# Patient Record
Sex: FEMALE | Race: WHITE | NOT HISPANIC OR LATINO | Employment: OTHER | URBAN - METROPOLITAN AREA
[De-identification: names, ages, dates, MRNs, and addresses within clinical notes are randomized per-mention and may not be internally consistent; named-entity substitution may affect disease eponyms.]

---

## 2020-01-17 ENCOUNTER — LAB VISIT (OUTPATIENT)
Dept: LAB | Facility: HOSPITAL | Age: 70
End: 2020-01-17
Attending: INTERNAL MEDICINE
Payer: MEDICARE

## 2020-01-17 ENCOUNTER — OFFICE VISIT (OUTPATIENT)
Dept: RHEUMATOLOGY | Facility: CLINIC | Age: 70
End: 2020-01-17
Payer: MEDICARE

## 2020-01-17 VITALS
BODY MASS INDEX: 22.65 KG/M2 | HEART RATE: 89 BPM | WEIGHT: 132.69 LBS | HEIGHT: 64 IN | SYSTOLIC BLOOD PRESSURE: 160 MMHG | DIASTOLIC BLOOD PRESSURE: 90 MMHG

## 2020-01-17 DIAGNOSIS — M25.50 ARTHRALGIA, UNSPECIFIED JOINT: Primary | ICD-10-CM

## 2020-01-17 DIAGNOSIS — M25.50 ARTHRALGIA, UNSPECIFIED JOINT: ICD-10-CM

## 2020-01-17 DIAGNOSIS — R21 RASH: ICD-10-CM

## 2020-01-17 LAB
ALBUMIN SERPL BCP-MCNC: 4 G/DL (ref 3.5–5.2)
ALP SERPL-CCNC: 71 U/L (ref 55–135)
ALT SERPL W/O P-5'-P-CCNC: 23 U/L (ref 10–44)
ANION GAP SERPL CALC-SCNC: 13 MMOL/L (ref 8–16)
AST SERPL-CCNC: 16 U/L (ref 10–40)
BASOPHILS # BLD AUTO: 0.08 K/UL (ref 0–0.2)
BASOPHILS NFR BLD: 0.7 % (ref 0–1.9)
BILIRUB SERPL-MCNC: 0.4 MG/DL (ref 0.1–1)
BUN SERPL-MCNC: 26 MG/DL (ref 8–23)
CALCIUM SERPL-MCNC: 9.9 MG/DL (ref 8.7–10.5)
CHLORIDE SERPL-SCNC: 104 MMOL/L (ref 95–110)
CO2 SERPL-SCNC: 25 MMOL/L (ref 23–29)
CREAT SERPL-MCNC: 0.9 MG/DL (ref 0.5–1.4)
CRP SERPL-MCNC: 15 MG/L (ref 0–8.2)
DIFFERENTIAL METHOD: ABNORMAL
EOSINOPHIL # BLD AUTO: 0.3 K/UL (ref 0–0.5)
EOSINOPHIL NFR BLD: 2.3 % (ref 0–8)
ERYTHROCYTE [DISTWIDTH] IN BLOOD BY AUTOMATED COUNT: 13.2 % (ref 11.5–14.5)
ERYTHROCYTE [SEDIMENTATION RATE] IN BLOOD BY WESTERGREN METHOD: 24 MM/HR (ref 0–36)
EST. GFR  (AFRICAN AMERICAN): >60 ML/MIN/1.73 M^2
EST. GFR  (NON AFRICAN AMERICAN): >60 ML/MIN/1.73 M^2
GLUCOSE SERPL-MCNC: 87 MG/DL (ref 70–110)
HCT VFR BLD AUTO: 45.3 % (ref 37–48.5)
HGB BLD-MCNC: 14.8 G/DL (ref 12–16)
IMM GRANULOCYTES # BLD AUTO: 0.04 K/UL (ref 0–0.04)
IMM GRANULOCYTES NFR BLD AUTO: 0.4 % (ref 0–0.5)
LYMPHOCYTES # BLD AUTO: 2 K/UL (ref 1–4.8)
LYMPHOCYTES NFR BLD: 17.2 % (ref 18–48)
MCH RBC QN AUTO: 30.6 PG (ref 27–31)
MCHC RBC AUTO-ENTMCNC: 32.7 G/DL (ref 32–36)
MCV RBC AUTO: 94 FL (ref 82–98)
MONOCYTES # BLD AUTO: 0.7 K/UL (ref 0.3–1)
MONOCYTES NFR BLD: 5.8 % (ref 4–15)
NEUTROPHILS # BLD AUTO: 8.4 K/UL (ref 1.8–7.7)
NEUTROPHILS NFR BLD: 74 % (ref 38–73)
NRBC BLD-RTO: 0 /100 WBC
PLATELET # BLD AUTO: 302 K/UL (ref 150–350)
PMV BLD AUTO: 9.2 FL (ref 9.2–12.9)
POTASSIUM SERPL-SCNC: 3.5 MMOL/L (ref 3.5–5.1)
PROT SERPL-MCNC: 7.4 G/DL (ref 6–8.4)
RBC # BLD AUTO: 4.83 M/UL (ref 4–5.4)
SODIUM SERPL-SCNC: 142 MMOL/L (ref 136–145)
WBC # BLD AUTO: 11.4 K/UL (ref 3.9–12.7)

## 2020-01-17 PROCEDURE — 99999 PR PBB SHADOW E&M-NEW PATIENT-LVL IV: ICD-10-PCS | Mod: PBBFAC,,, | Performed by: INTERNAL MEDICINE

## 2020-01-17 PROCEDURE — 80053 COMPREHEN METABOLIC PANEL: CPT

## 2020-01-17 PROCEDURE — 86140 C-REACTIVE PROTEIN: CPT

## 2020-01-17 PROCEDURE — 99205 PR OFFICE/OUTPT VISIT, NEW, LEVL V, 60-74 MIN: ICD-10-PCS | Mod: S$PBB,,, | Performed by: INTERNAL MEDICINE

## 2020-01-17 PROCEDURE — 86431 RHEUMATOID FACTOR QUANT: CPT

## 2020-01-17 PROCEDURE — 1125F AMNT PAIN NOTED PAIN PRSNT: CPT | Mod: ,,, | Performed by: INTERNAL MEDICINE

## 2020-01-17 PROCEDURE — 1159F PR MEDICATION LIST DOCUMENTED IN MEDICAL RECORD: ICD-10-PCS | Mod: ,,, | Performed by: INTERNAL MEDICINE

## 2020-01-17 PROCEDURE — 99204 OFFICE O/P NEW MOD 45 MIN: CPT | Mod: PBBFAC | Performed by: INTERNAL MEDICINE

## 2020-01-17 PROCEDURE — 1125F PR PAIN SEVERITY QUANTIFIED, PAIN PRESENT: ICD-10-PCS | Mod: ,,, | Performed by: INTERNAL MEDICINE

## 2020-01-17 PROCEDURE — 99999 PR PBB SHADOW E&M-NEW PATIENT-LVL IV: CPT | Mod: PBBFAC,,, | Performed by: INTERNAL MEDICINE

## 2020-01-17 PROCEDURE — 86200 CCP ANTIBODY: CPT

## 2020-01-17 PROCEDURE — 99205 OFFICE O/P NEW HI 60 MIN: CPT | Mod: S$PBB,,, | Performed by: INTERNAL MEDICINE

## 2020-01-17 PROCEDURE — 36415 COLL VENOUS BLD VENIPUNCTURE: CPT

## 2020-01-17 PROCEDURE — 1159F MED LIST DOCD IN RCRD: CPT | Mod: ,,, | Performed by: INTERNAL MEDICINE

## 2020-01-17 PROCEDURE — 85025 COMPLETE CBC W/AUTO DIFF WBC: CPT

## 2020-01-17 PROCEDURE — 86038 ANTINUCLEAR ANTIBODIES: CPT

## 2020-01-17 PROCEDURE — 85652 RBC SED RATE AUTOMATED: CPT

## 2020-01-17 PROCEDURE — 86060 ANTISTREPTOLYSIN O TITER: CPT

## 2020-01-17 RX ORDER — LOVASTATIN 20 MG/1
TABLET ORAL
COMMUNITY
Start: 2019-10-31

## 2020-01-17 RX ORDER — ESTRADIOL 0.1 MG/G
CREAM VAGINAL
COMMUNITY

## 2020-01-17 RX ORDER — ESTRADIOL 0.5 MG/1
TABLET ORAL
COMMUNITY
Start: 2020-01-04

## 2020-01-17 RX ORDER — ERGOCALCIFEROL 1.25 MG/1
CAPSULE ORAL
COMMUNITY

## 2020-01-17 RX ORDER — NITROGLYCERIN 80 MG/1
PATCH TRANSDERMAL
COMMUNITY
Start: 2019-12-31

## 2020-01-17 RX ORDER — DEXTROAMPHETAMINE SACCHARATE, AMPHETAMINE ASPARTATE, DEXTROAMPHETAMINE SULFATE AND AMPHETAMINE SULFATE 5; 5; 5; 5 MG/1; MG/1; MG/1; MG/1
TABLET ORAL
COMMUNITY
Start: 2019-12-20

## 2020-01-17 RX ORDER — HYOSCYAMINE SULFATE 0.125 MG
0.12 TABLET ORAL EVERY 4 HOURS PRN
COMMUNITY

## 2020-01-17 RX ORDER — PRAMIPEXOLE DIHYDROCHLORIDE 0.5 MG/1
TABLET ORAL
COMMUNITY
Start: 2019-12-19

## 2020-01-17 RX ORDER — HYDROCODONE BITARTRATE AND ACETAMINOPHEN 10; 325 MG/1; MG/1
TABLET ORAL
COMMUNITY
Start: 2019-12-17

## 2020-01-17 RX ORDER — AMLODIPINE BESYLATE 10 MG/1
TABLET ORAL
COMMUNITY
Start: 2020-01-03

## 2020-01-17 RX ORDER — ALBUTEROL SULFATE 90 UG/1
AEROSOL, METERED RESPIRATORY (INHALATION)
COMMUNITY
Start: 2019-09-04

## 2020-01-17 RX ORDER — IBANDRONATE SODIUM 150 MG/1
TABLET, FILM COATED ORAL
COMMUNITY
Start: 2020-01-04

## 2020-01-17 RX ORDER — FUROSEMIDE 40 MG/1
40 TABLET ORAL DAILY PRN
COMMUNITY
Start: 2018-10-18

## 2020-01-17 RX ORDER — METHOCARBAMOL 750 MG/1
TABLET, FILM COATED ORAL
COMMUNITY
Start: 2019-11-17

## 2020-01-17 RX ORDER — DICYCLOMINE HYDROCHLORIDE 20 MG/1
TABLET ORAL
COMMUNITY
Start: 2017-11-29

## 2020-01-17 RX ORDER — CYANOCOBALAMIN 1000 UG/ML
INJECTION, SOLUTION INTRAMUSCULAR; SUBCUTANEOUS
COMMUNITY

## 2020-01-17 RX ORDER — LEVOTHYROXINE SODIUM 88 UG/1
TABLET ORAL
COMMUNITY
Start: 2019-11-26

## 2020-01-18 LAB
CCP AB SER IA-ACNC: <0.5 U/ML
RHEUMATOID FACT SERPL-ACNC: <10 IU/ML (ref 0–15)

## 2020-01-20 LAB
ANA SER QL IF: NORMAL
ASO AB SERPL-ACNC: 149 IU/ML

## 2020-02-07 ENCOUNTER — TELEPHONE (OUTPATIENT)
Dept: RHEUMATOLOGY | Facility: CLINIC | Age: 70
End: 2020-02-07

## 2020-02-07 NOTE — TELEPHONE ENCOUNTER
----- Message from Evy Mansfield MA sent at 2/7/2020  9:00 AM CST -----  Contact: Pt   Pt needs new cupid order and needs to schedule any day the week of the 17th around 11am except the 20th. Please call PT when appt is reschedule.      ----- Message -----  From: Quang Ye  Sent: 2/7/2020   8:40 AM CST  To: [facility] Clinical Support    Pt called in regards to rescheduling 11 o'clock appointment. Pt can be reached at [phone] (mobile) [phone] (home)

## 2020-12-08 NOTE — PROGRESS NOTES
CC:  Chief Complaint   Patient presents with    other     +ASO       History of Present Illness:  Tracey Hadley a 69 y.o.yo female who presents for further evaluation of her positive ASO antibody  Six months back she developed sudden onset of fatigue, then she developed a rash in her lower extremities  Then she further developed cellulitis and was treated with antibiotics  She was treated by Dr. Lopez Melvin from [place], [place]   Then she has started having episodes like sudden flutter in the chest which would last for few seconds and then back to normal  She has history of fibromyalgia and she also has generalized aches  No joint swelling  Currently rash resolved    She is here for further evaluation and to rule out rheumatic fever  Denies any fever no active rash  No joint swelling  No history of migratory arthritis    Review of Systems:  Constitutional: Denies fever, chills. No recent weight changes.   Fatigue: yes  Muscle weakness: no  Headaches: no new headaches  Eyes: No redness .  No recent visual changes.  ENT:  No oral or nasal ulcers.  Card: No chest pain.  Resp: No cough or sob.   Gastro: No nausea or vomiting.  No heartburn.  Constipation: no  Diarrhea: no  Genito:  No dysuria.  No genital ulcers.  Skin: per HPI   Raynauds:no  Neuro: non focal   Psych:  + anxiety  Endo:  no excess thirst.  Heme: no abnormal bleeding or bruising  Clots:none   Miscarriages : none     PMH :  Fibromyalgia    PSH :  Appendectomy   hystrectomy       Social History     Tobacco Use    Smoking status: Current Every Day Smoker     Packs/day: 0.50     Types: Cigarettes     Start date: 1/17/1978   Substance Use Topics    Alcohol use: Not on file    Drug use: Not on file       Family History   Problem Relation Age of Onset    Hypertension Mother     Hypertension Father        Review of patient's allergies indicates:   Allergen Reactions    Iodine containing multivitamin Anaphylaxis    Cephalexin Other (See  Comments)     Blisters all over body, mouth, down esophagus into stomach; ulcers  Blisters all over body, mouth, down esophagus into stomach; ulcers      Codeine      Gi upset  Gi upset      Iodinated contrast media     Methylprednisolone     Moxifloxacin      Blisters all over body, mouth, down esophagus into stomach; ulcers  Blisters all over body, mouth, down esophagus into stomach; ulcers      Penicillins      Blisters all over body, mouth, down esophagus into stomach; ulcers  Blisters all over body, mouth, down esophagus into stomach; ulcers      Pentazocine lactate      hallucinations  hallucinations      Prednisolone Itching     Turns red  Turns red      Sulfa (sulfonamide antibiotics)      Blisters all over body, mouth, down esophagus into stomach; ulcers  Blisters all over body, mouth, down esophagus into stomach; ulcers      Tramadol      Gi upset  Gi upset                 OBJECTIVE:     Vital Signs   Vitals:    01/17/20 1347   BP: (!) 160/90   Pulse: 89     Physical Exam:  General Appearance:  NAD.   Gait: not favoring.  HEENT: PERRL.  Eyes not dry or injected.  No nasal ulcers.  Mouth not dry, no oral lesions.  Lymph: cervical, supraclavicular or axillary nodes: none abnormal   Cardio: no irregularity of S1 or S2.  No gallops or rubs.   Resp: Normal respiratory motion. Clear to auscultation bilaterally.   No abnormal chest conformation.  Abd: Soft, non-tender, nondistended.  No masses.   Skin: Head and neck,  and extremities examined. No rashes.   Neuro: Ox3.   Cranial nerves II-XII grossly intact.   Sensation intact  in both distal LE and upper extremities to light touch.  Musculoskeletal Exam:    Right Side Rheumatological Exam     Examination finds the shoulder, elbow, wrist, knee, 1st PIP, 1st MCP, 2nd PIP, 2nd MCP, 3rd PIP, 3rd MCP, 4th PIP, 4th MCP, 5th PIP and 5th MCP no synovitis     Others :  Hip: Normal ROM     Ankle :No synovitis   Foot:No synovitis     Left Side Rheumatological Exam      Examination finds the shoulder, elbow, wrist, knee, 1st PIP, 1st MCP, 2nd PIP, 2nd MCP, 3rd PIP, 3rd MCP, 4th PIP, 4th MCP, 5th PIP and 5th MCP no synovitis     Others :  Hip:Normal ROM    Ankle :No synovitis   Foot:No synovitis       Tender points:++  Muscle strength:Equal and full in all mm groups of the upper and lower ext.    Laboratory: I have reviewed all of the patient's relevant lab work available in the medical record and have utilized this in my evaluation and management recommendations today    Imaging : I have reviewed all of the patient's diagnostic/imaging results available in the medical record and have utilized this in my evaluation and management recommendations today.    Notes reviewed  Other procedures:    ASSESSMENT/PLAN:     Arthralgia, unspecified joint  -     CBC auto differential; Standing  -     Comprehensive metabolic panel; Standing  -     C-reactive protein; Standing  -     Sedimentation rate; Standing  -     Rheumatoid factor; Future; Expected date: 01/17/2020  -     Cyclic citrul peptide antibody, IgG; Future; Expected date: 01/17/2020  -     LIZABETH Screen w/Reflex; Standing  -     Antistreptolysin O titer; Future; Expected date: 01/17/2020  -     2D Echo Only; Future    Rash  -     Ambulatory Referral to Dermatology      ASO titer repeated negative  No history of migratory arthritis, no evidence of inflammatory arthritis noted on exam  Other autoimmune workup including RF/CCP/LIZABETH negative    Do not suspect a rheumatic fever at this point  Also check 2d Echo    Rash:  Few spots noted, refer to derm    Fibromyalgia per history and stable    2 week return for review    Thank you for the referral    Risks vs Benefits and potential side effects of medication prescribed today were discussed with patient. Medication literature given to patient up discharge  Went over uptodate information /literature on the meds prescribed today    If you are unsure what to do please call our office [facility]  [facility] [phone]    Disclaimer: This note was prepared using voice recognition system and is likely to have sound alike errors and is not proof read.  Please call me with any questions.   Quality 226: Preventive Care And Screening: Tobacco Use: Screening And Cessation Intervention: Patient screened for tobacco use, is a smoker AND received Cessation Counseling Detail Level: Detailed Quality 130: Documentation Of Current Medications In The Medical Record: Current Medications Documented Quality 47: Advance Care Plan: Advance Care Planning discussed and documented; advance care plan or surrogate decision maker documented in the medical record. Quality 111:Pneumonia Vaccination Status For Older Adults: Pneumococcal Vaccination Previously Received Quality 431: Preventive Care And Screening: Unhealthy Alcohol Use - Screening: Patient screened for unhealthy alcohol use using a single question and scores less than 2 times per year